# Patient Record
Sex: FEMALE | Race: WHITE | Employment: OTHER | URBAN - METROPOLITAN AREA
[De-identification: names, ages, dates, MRNs, and addresses within clinical notes are randomized per-mention and may not be internally consistent; named-entity substitution may affect disease eponyms.]

---

## 2018-04-14 PROCEDURE — 83516 IMMUNOASSAY NONANTIBODY: CPT | Performed by: INTERNAL MEDICINE

## 2018-04-14 PROCEDURE — 36415 COLL VENOUS BLD VENIPUNCTURE: CPT | Performed by: INTERNAL MEDICINE

## 2019-08-23 PROCEDURE — 88175 CYTOPATH C/V AUTO FLUID REDO: CPT | Performed by: FAMILY MEDICINE

## 2020-01-27 PROBLEM — M43.16 SPONDYLOLISTHESIS, LUMBAR REGION: Status: ACTIVE | Noted: 2020-01-27

## 2020-01-27 PROBLEM — M43.06 SPONDYLOLYSIS OF LUMBAR REGION: Status: ACTIVE | Noted: 2020-01-27

## 2022-04-04 ENCOUNTER — OFFICE VISIT (OUTPATIENT)
Dept: OBGYN CLINIC | Facility: CLINIC | Age: 27
End: 2022-04-04
Payer: COMMERCIAL

## 2022-04-04 VITALS
DIASTOLIC BLOOD PRESSURE: 72 MMHG | WEIGHT: 196 LBS | BODY MASS INDEX: 31.5 KG/M2 | HEIGHT: 66 IN | SYSTOLIC BLOOD PRESSURE: 115 MMHG | HEART RATE: 89 BPM

## 2022-04-04 DIAGNOSIS — N92.6 MISSED MENSES: Primary | ICD-10-CM

## 2022-04-04 DIAGNOSIS — Z32.01 PREGNANCY EXAMINATION OR TEST, POSITIVE RESULT: ICD-10-CM

## 2022-04-04 LAB
CONTROL LINE PRESENT WITH A CLEAR BACKGROUND (YES/NO): YES YES/NO
PREGNANCY TEST, URINE: POSITIVE

## 2022-04-04 PROCEDURE — 81025 URINE PREGNANCY TEST: CPT | Performed by: ADVANCED PRACTICE MIDWIFE

## 2022-04-04 PROCEDURE — 3074F SYST BP LT 130 MM HG: CPT | Performed by: ADVANCED PRACTICE MIDWIFE

## 2022-04-04 PROCEDURE — 99202 OFFICE O/P NEW SF 15 MIN: CPT | Performed by: ADVANCED PRACTICE MIDWIFE

## 2022-04-04 PROCEDURE — 3078F DIAST BP <80 MM HG: CPT | Performed by: ADVANCED PRACTICE MIDWIFE

## 2022-04-04 PROCEDURE — 3008F BODY MASS INDEX DOCD: CPT | Performed by: ADVANCED PRACTICE MIDWIFE

## 2022-04-04 RX ORDER — PRENATAL VIT/IRON FUM/FOLIC AC 27MG-0.8MG
1 TABLET ORAL DAILY
COMMUNITY

## 2022-04-06 ENCOUNTER — NURSE ONLY (OUTPATIENT)
Dept: OBGYN CLINIC | Facility: CLINIC | Age: 27
End: 2022-04-06
Payer: COMMERCIAL

## 2022-04-06 DIAGNOSIS — Z34.01 ENCOUNTER FOR SUPERVISION OF NORMAL FIRST PREGNANCY IN FIRST TRIMESTER: Primary | ICD-10-CM

## 2022-04-06 NOTE — PROGRESS NOTES
Phone OB RN education visit, educational material reviewed. Pt verbalized understanding. [facility] orders placed for NOB labs including HCV, hemoglobin A1C, progesterone(per pt request, ok to order per mes) and nutrition consult. Number to schedule given to patient. Patient declined genetic testing. Pt had a Pap 20 Negative. Pt was scheduled for NOB appt. Patient will complete EDPS and VIRGILIO at next appointment. Pt desires natural birth and undecided about water birth. Pt. Has answered NO 5P questions and has NO  risk factors. Pt. Given What Pregnant need to Know handout     Pt. Counseled on AGOG and ACNM guidelines recommending carrier testing. . Patient declined testing.

## 2022-04-16 LAB
HEMOGLOBIN A1C: 5.1 % OF TOTAL HGB
PROGESTERONE: 20.8 NG/ML
SIGNAL TO CUT-OFF: 0.01

## 2022-04-17 LAB
ABSOLUTE BASOPHILS: 41 CELLS/UL (ref 0–200)
ABSOLUTE EOSINOPHILS: 177 CELLS/UL (ref 15–500)
ABSOLUTE LYMPHOCYTES: 2190 CELLS/UL (ref 850–3900)
ABSOLUTE MONOCYTES: 496 CELLS/UL (ref 200–950)
ABSOLUTE NEUTROPHILS: 3896 CELLS/UL (ref 1500–7800)
BASOPHILS: 0.6 %
EOSINOPHILS: 2.6 %
HEMATOCRIT: 42 % (ref 35–45)
HEMOGLOBIN: 14.5 G/DL (ref 11.7–15.5)
LYMPHOCYTES: 32.2 %
MCH: 29.2 PG (ref 27–33)
MCHC: 34.5 G/DL (ref 32–36)
MCV: 84.5 FL (ref 80–100)
MONOCYTES: 7.3 %
MPV: 9.5 FL (ref 7.5–12.5)
NEUTROPHILS: 57.3 %
PLATELET COUNT: 308 THOUSAND/UL (ref 140–400)
RDW: 13.5 % (ref 11–15)
RED BLOOD CELL COUNT: 4.97 MILLION/UL (ref 3.8–5.1)
RUBELLA ANTIBODY (IGG): 1.43 INDEX
WHITE BLOOD CELL COUNT: 6.8 THOUSAND/UL (ref 3.8–10.8)

## 2022-04-19 ENCOUNTER — HOSPITAL ENCOUNTER (OUTPATIENT)
Dept: ULTRASOUND IMAGING | Age: 27
Discharge: HOME | End: 2022-04-19
Attending: ADVANCED PRACTICE MIDWIFE
Payer: COMMERCIAL

## 2022-04-19 DIAGNOSIS — Z32.01 PREGNANCY EXAMINATION OR TEST, POSITIVE RESULT: ICD-10-CM

## 2022-04-19 PROCEDURE — 76801 OB US < 14 WKS SINGLE FETUS: CPT | Performed by: ADVANCED PRACTICE MIDWIFE

## 2022-04-20 ENCOUNTER — TELEPHONE (OUTPATIENT)
Dept: OBGYN CLINIC | Facility: CLINIC | Age: 27
End: 2022-04-20

## 2022-04-20 PROBLEM — O26.899 RH NEGATIVE STATE IN ANTEPARTUM PERIOD: Status: ACTIVE | Noted: 2022-04-20

## 2022-04-20 PROBLEM — Z67.91 RH NEGATIVE STATE IN ANTEPARTUM PERIOD: Status: ACTIVE | Noted: 2022-04-20

## 2022-04-20 NOTE — TELEPHONE ENCOUNTER
----- Message from Danilo Dominguez CNM sent at 4/20/2022 11:20 AM CDT -----  Please notify patient by phone that the attached prenatal labs were normal including a negative HIV result. She is Rh negative and will need rhogam per guidelines.

## 2022-04-20 NOTE — TELEPHONE ENCOUNTER
Spoke to patient, informed patient per TM, normal labs including negative HIV. Advised she is RH negative and will need Rhogam at 28 weeks and if she has any spotting or bleeding in the pregnancy.  Patient agrees with plan and verbally understands

## 2022-04-27 ENCOUNTER — INITIAL PRENATAL (OUTPATIENT)
Dept: OBGYN CLINIC | Facility: CLINIC | Age: 27
End: 2022-04-27
Payer: COMMERCIAL

## 2022-04-27 VITALS
HEART RATE: 90 BPM | SYSTOLIC BLOOD PRESSURE: 121 MMHG | DIASTOLIC BLOOD PRESSURE: 71 MMHG | BODY MASS INDEX: 31 KG/M2 | WEIGHT: 193.81 LBS

## 2022-04-27 DIAGNOSIS — Z34.01 ENCOUNTER FOR SUPERVISION OF NORMAL FIRST PREGNANCY IN FIRST TRIMESTER: Primary | ICD-10-CM

## 2022-04-27 DIAGNOSIS — Z11.3 SCREEN FOR STD (SEXUALLY TRANSMITTED DISEASE): ICD-10-CM

## 2022-04-27 DIAGNOSIS — Z12.4 SCREENING FOR MALIGNANT NEOPLASM OF CERVIX: ICD-10-CM

## 2022-04-27 PROBLEM — Z28.310: Status: ACTIVE | Noted: 2022-04-27

## 2022-04-27 LAB
BILIRUBIN: NEGATIVE
GLUCOSE (URINE DIPSTICK): NEGATIVE MG/DL
KETONES (URINE DIPSTICK): 80 MG/DL
LEUKOCYTES: NEGATIVE
MULTISTIX LOT#: ABNORMAL NUMERIC
NITRITE, URINE: NEGATIVE
OCCULT BLOOD: NEGATIVE
PH, URINE: 7 (ref 4.5–8)
PROTEIN (URINE DIPSTICK): NEGATIVE MG/DL
SPECIFIC GRAVITY: 1.01 (ref 1–1.03)
URINE-COLOR: YELLOW
UROBILINOGEN,SEMI-QN: 0.2 MG/DL (ref 0–1.9)

## 2022-04-27 PROCEDURE — 81002 URINALYSIS NONAUTO W/O SCOPE: CPT | Performed by: ADVANCED PRACTICE MIDWIFE

## 2022-04-27 PROCEDURE — 3078F DIAST BP <80 MM HG: CPT | Performed by: ADVANCED PRACTICE MIDWIFE

## 2022-04-27 PROCEDURE — 3074F SYST BP LT 130 MM HG: CPT | Performed by: ADVANCED PRACTICE MIDWIFE

## 2022-04-28 NOTE — PROGRESS NOTES
NOB labs & ultrasound results refused  Pt declines covid vaccine after counseling  Pap completed- slight spotting after Pap.   Warning signs reviewed All questions answered

## 2022-04-29 LAB
CHLAMYDIA TRACHOMATIS$RNA, TMA: NOT DETECTED
NEISSERIA GONORRHOEAE$RNA, TMA: NOT DETECTED

## 2022-05-07 ENCOUNTER — TELEPHONE (OUTPATIENT)
Dept: OBGYN CLINIC | Facility: CLINIC | Age: 27
End: 2022-05-07

## 2022-05-07 PROBLEM — R87.615 ENCOUNTER FOR REPEAT PAP SMEAR DUE TO PREVIOUS INSUFF CERVICAL CELLS: Status: ACTIVE | Noted: 2022-05-07

## 2022-05-07 NOTE — TELEPHONE ENCOUNTER
----- Message from [facility], FELIPA sent at 5/7/2022 10:19 AM CDT -----  Insufficient cells on pap  Repeat PP

## 2022-05-09 NOTE — TELEPHONE ENCOUNTER
Spoke to patient, informed patient per mes, insufficient cells on pap and will need to repeat pp.  Patient agrees with plan and verbally understands

## 2022-12-14 ENCOUNTER — TELEPHONE (OUTPATIENT)
Dept: OBGYN CLINIC | Facility: CLINIC | Age: 27
End: 2022-12-14

## 2022-12-15 NOTE — TELEPHONE ENCOUNTER
Doing chart audits. Did this patient transfer care? I do not see any note of transferring, only an initial prenatal visit.